# Patient Record
Sex: MALE | Race: WHITE | NOT HISPANIC OR LATINO
[De-identification: names, ages, dates, MRNs, and addresses within clinical notes are randomized per-mention and may not be internally consistent; named-entity substitution may affect disease eponyms.]

---

## 2020-01-22 ENCOUNTER — TRANSCRIPTION ENCOUNTER (OUTPATIENT)
Age: 35
End: 2020-01-22

## 2020-02-20 ENCOUNTER — APPOINTMENT (OUTPATIENT)
Dept: INTERNAL MEDICINE | Facility: CLINIC | Age: 35
End: 2020-02-20
Payer: COMMERCIAL

## 2020-02-20 ENCOUNTER — TRANSCRIPTION ENCOUNTER (OUTPATIENT)
Age: 35
End: 2020-02-20

## 2020-02-20 VITALS
HEART RATE: 58 BPM | DIASTOLIC BLOOD PRESSURE: 68 MMHG | TEMPERATURE: 97.6 F | HEIGHT: 71 IN | OXYGEN SATURATION: 98 % | WEIGHT: 230 LBS | BODY MASS INDEX: 32.2 KG/M2 | SYSTOLIC BLOOD PRESSURE: 112 MMHG

## 2020-02-20 DIAGNOSIS — Z82.69 FAMILY HISTORY OF OTHER DISEASES OF THE MUSCULOSKELETAL SYSTEM AND CONNECTIVE TISSUE: ICD-10-CM

## 2020-02-20 DIAGNOSIS — Z82.0 FAMILY HISTORY OF EPILEPSY AND OTHER DISEASES OF THE NERVOUS SYSTEM: ICD-10-CM

## 2020-02-20 DIAGNOSIS — Z87.891 PERSONAL HISTORY OF NICOTINE DEPENDENCE: ICD-10-CM

## 2020-02-20 DIAGNOSIS — Z11.4 ENCOUNTER FOR SCREENING FOR HUMAN IMMUNODEFICIENCY VIRUS [HIV]: ICD-10-CM

## 2020-02-20 DIAGNOSIS — Z82.49 FAMILY HISTORY OF ISCHEMIC HEART DISEASE AND OTHER DISEASES OF THE CIRCULATORY SYSTEM: ICD-10-CM

## 2020-02-20 DIAGNOSIS — S62.91XA UNSPECIFIED FRACTURE OF RIGHT WRIST AND HAND, INITIAL ENCOUNTER FOR CLOSED FRACTURE: ICD-10-CM

## 2020-02-20 DIAGNOSIS — J45.909 UNSPECIFIED ASTHMA, UNCOMPLICATED: ICD-10-CM

## 2020-02-20 DIAGNOSIS — R06.89 OTHER ABNORMALITIES OF BREATHING: ICD-10-CM

## 2020-02-20 DIAGNOSIS — Z83.49 FAMILY HISTORY OF OTHER ENDOCRINE, NUTRITIONAL AND METABOLIC DISEASES: ICD-10-CM

## 2020-02-20 PROCEDURE — 94010 BREATHING CAPACITY TEST: CPT

## 2020-02-20 PROCEDURE — 93000 ELECTROCARDIOGRAM COMPLETE: CPT

## 2020-02-20 PROCEDURE — 99385 PREV VISIT NEW AGE 18-39: CPT | Mod: 25

## 2020-02-20 RX ORDER — MULTIVITAMIN
TABLET ORAL
Refills: 0 | Status: ACTIVE | COMMUNITY

## 2020-02-20 NOTE — HEALTH RISK ASSESSMENT
[0] : 1) Little interest or pleasure doing things: Not at all (0) [Yes] : Yes [2 - 3 times a week (3 pts)] : 2 - 3  times a week (3 points) [7 to 9 (3 pts)] : 7 to 9 (3  points) [No] : In the past 12 months have you used drugs other than those required for medical reasons? No [HIV Test offered] : HIV Test offered [Designated Healthcare Proxy] : Designated healthcare proxy [Name: ___] : Health Care Proxy's Name: [unfilled]  [Relationship: ___] : Relationship: [unfilled] [Audit-CScore] : 6 [] : No [de-identified] : pt was seen by dentist last wk [AdvancecareDate] : 02/20

## 2020-02-20 NOTE — PHYSICAL EXAM
[Well Nourished] : well nourished [No Acute Distress] : no acute distress [Well Developed] : well developed [Well-Appearing] : well-appearing [PERRL] : pupils equal round and reactive to light [Normal Sclera/Conjunctiva] : normal sclera/conjunctiva [Normal Outer Ear/Nose] : the outer ears and nose were normal in appearance [Normal TMs] : both tympanic membranes were normal [Normal Oropharynx] : the oropharynx was normal [No Lymphadenopathy] : no lymphadenopathy [Supple] : supple [No Respiratory Distress] : no respiratory distress  [Thyroid Normal, No Nodules] : the thyroid was normal and there were no nodules present [No Accessory Muscle Use] : no accessory muscle use [Regular Rhythm] : with a regular rhythm [Clear to Auscultation] : lungs were clear to auscultation bilaterally [Normal Rate] : normal rate  [Normal S1, S2] : normal S1 and S2 [No Murmur] : no murmur heard [No Edema] : there was no peripheral edema [Soft] : abdomen soft [Non Tender] : non-tender [Non-distended] : non-distended [No Masses] : no abdominal mass palpated [Normal Supraclavicular Nodes] : no supraclavicular lymphadenopathy [Normal Bowel Sounds] : normal bowel sounds [Normal Axillary Nodes] : no axillary lymphadenopathy [Normal Posterior Cervical Nodes] : no posterior cervical lymphadenopathy [Normal Anterior Cervical Nodes] : no anterior cervical lymphadenopathy [Normal Inguinal Nodes] : no inguinal lymphadenopathy [Grossly Normal Strength/Tone] : grossly normal strength/tone [No Focal Deficits] : no focal deficits [Normal Gait] : normal gait [Normal Affect] : the affect was normal [Normal Insight/Judgement] : insight and judgment were intact [de-identified] : rash-erythematous scaly rash-scalp, ext ear- R.  pitting finger nails

## 2020-02-20 NOTE — PLAN
[FreeTextEntry1] : rec pt not use keto pills\par EKG- sinus missy 50\par spirometry-nl- no obstruction\par f/u if breathing issue is worse

## 2020-02-20 NOTE — HISTORY OF PRESENT ILLNESS
[FreeTextEntry1] : CPE [de-identified] : vaccine-10/19.  pt will release rec tdap vaccine\par diet- reviewed w diet\par exercise- walking 15,000 steps/ day.  has feeling that he has to take deep breath after eating- lasting a few min- started 6 yr ago - 1 hr.  resolves spontaneous.  no SOB w activity.  no abd pain or CP after meals\par asthma- last episode was in childhood\par tinnitus- intermittent-resolves in 1 min- - " ringing"- occurs a few times/mo- started a few yr ago.  no hearing loss\par pt stopped smoking 1 mo ago- cold turkey\par taking Keto pills for obesity\par 12 pk- on the weekend\par no testicular pain

## 2020-02-20 NOTE — COUNSELING
[Potential consequences of obesity discussed] : Potential consequences of obesity discussed [Structured Weight Management Program suggested:] : Structured weight management program suggested [Benefits of weight loss discussed] : Benefits of weight loss discussed [Encouraged to increase physical activity] : Encouraged to increase physical activity [Good understanding] : Patient has a good understanding of disease, goals and obesity follow-up plan

## 2020-02-25 ENCOUNTER — APPOINTMENT (OUTPATIENT)
Dept: INTERNAL MEDICINE | Facility: CLINIC | Age: 35
End: 2020-02-25
Payer: COMMERCIAL

## 2020-02-25 PROCEDURE — 36415 COLL VENOUS BLD VENIPUNCTURE: CPT

## 2020-02-26 LAB
ALBUMIN SERPL ELPH-MCNC: 4.8 G/DL
ALP BLD-CCNC: 59 U/L
ALT SERPL-CCNC: 27 U/L
ANION GAP SERPL CALC-SCNC: 13 MMOL/L
AST SERPL-CCNC: 20 U/L
BASOPHILS # BLD AUTO: 0.03 K/UL
BASOPHILS NFR BLD AUTO: 0.6 %
BILIRUB SERPL-MCNC: 0.3 MG/DL
BUN SERPL-MCNC: 16 MG/DL
CALCIUM SERPL-MCNC: 10 MG/DL
CHLORIDE SERPL-SCNC: 106 MMOL/L
CHOLEST SERPL-MCNC: 227 MG/DL
CHOLEST/HDLC SERPL: 4 RATIO
CO2 SERPL-SCNC: 25 MMOL/L
CREAT SERPL-MCNC: 0.8 MG/DL
EOSINOPHIL # BLD AUTO: 0.14 K/UL
EOSINOPHIL NFR BLD AUTO: 2.6 %
GLUCOSE SERPL-MCNC: 95 MG/DL
HCT VFR BLD CALC: 44.6 %
HDLC SERPL-MCNC: 57 MG/DL
HGB BLD-MCNC: 15.1 G/DL
HIV1+2 AB SPEC QL IA.RAPID: NONREACTIVE
IMM GRANULOCYTES NFR BLD AUTO: 0.2 %
LDLC SERPL CALC-MCNC: 141 MG/DL
LYMPHOCYTES # BLD AUTO: 1.27 K/UL
LYMPHOCYTES NFR BLD AUTO: 23.3 %
MAN DIFF?: NORMAL
MCHC RBC-ENTMCNC: 32.3 PG
MCHC RBC-ENTMCNC: 33.9 GM/DL
MCV RBC AUTO: 95.3 FL
MONOCYTES # BLD AUTO: 0.37 K/UL
MONOCYTES NFR BLD AUTO: 6.8 %
NEUTROPHILS # BLD AUTO: 3.62 K/UL
NEUTROPHILS NFR BLD AUTO: 66.5 %
PLATELET # BLD AUTO: 253 K/UL
POTASSIUM SERPL-SCNC: 4.7 MMOL/L
PROT SERPL-MCNC: 7.4 G/DL
RBC # BLD: 4.68 M/UL
RBC # FLD: 12.8 %
SAVE SPECIMEN: NORMAL
SODIUM SERPL-SCNC: 144 MMOL/L
T4 FREE SERPL-MCNC: 1 NG/DL
TRIGL SERPL-MCNC: 149 MG/DL
TSH SERPL-ACNC: 1.45 UIU/ML
WBC # FLD AUTO: 5.44 K/UL

## 2020-04-20 ENCOUNTER — APPOINTMENT (OUTPATIENT)
Dept: DISASTER EMERGENCY | Facility: CLINIC | Age: 35
End: 2020-04-20

## 2020-04-26 ENCOUNTER — MESSAGE (OUTPATIENT)
Age: 35
End: 2020-04-26

## 2020-04-29 ENCOUNTER — EMERGENCY (EMERGENCY)
Facility: HOSPITAL | Age: 35
LOS: 1 days | Discharge: HOME | End: 2020-04-29
Admitting: EMERGENCY MEDICINE
Payer: COMMERCIAL

## 2020-04-29 VITALS
SYSTOLIC BLOOD PRESSURE: 146 MMHG | DIASTOLIC BLOOD PRESSURE: 84 MMHG | TEMPERATURE: 98 F | OXYGEN SATURATION: 100 % | RESPIRATION RATE: 18 BRPM | HEART RATE: 74 BPM

## 2020-04-29 PROCEDURE — 99283 EMERGENCY DEPT VISIT LOW MDM: CPT

## 2020-04-29 NOTE — ED ADULT TRIAGE NOTE - CHIEF COMPLAINT QUOTE
c/o needle stick that fell form sharps container, endorses skin puncture to left leg. denies medical hx.

## 2020-04-30 RX ORDER — RALTEGRAVIR 400 MG/1
1 TABLET, FILM COATED ORAL
Qty: 42 | Refills: 0
Start: 2020-04-30 | End: 2020-05-20

## 2020-04-30 RX ORDER — EMTRICITABINE AND TENOFOVIR DISOPROXIL FUMARATE 200; 300 MG/1; MG/1
1 TABLET, FILM COATED ORAL
Qty: 21 | Refills: 0
Start: 2020-04-30 | End: 2020-05-20

## 2020-04-30 NOTE — ED PROVIDER NOTE - PROGRESS NOTE DETAILS
LUIS MOE:  Pt was given 1 week supply of PEP.  Pt took STAT dose in ED.  Pt medically stable for discharge.  Pt to follow up with PMD and employee health within 3 days.

## 2020-04-30 NOTE — ED PROVIDER NOTE - PHYSICAL EXAMINATION
+ puncture wound at left anterior thigh; no redness; no warmth; no tenderness; no swelling; no crepitus

## 2020-04-30 NOTE — ED PROVIDER NOTE - NSFOLLOWUPINSTRUCTIONS_ED_ALL_ED_FT
Advance activity as tolerated.  Continue all previously prescribed medications as directed unless otherwise instructed.  Take Truvada once a day for one month.  Take Isentress twice a day for one month.  Follow up with your primary care physician in 48-72 hours and employee health services in 3 days- bring copies of your results.  Return to the ER for worsening or persistent symptoms, including but not limited to redness, swelling, pain, pus at puncture wound and/or ANY NEW OR CONCERNING SYMPTOMS. If you have issues obtaining follow up, please call: [phone] to obtain a doctor or specialist who takes your insurance in your area.  You may call [phone] to make an appointment with the internal medicine clinic.

## 2020-04-30 NOTE — ED PROVIDER NOTE - OBJECTIVE STATEMENT
Pt is a 33 y/o M former smoker no PMHx p/w needle stick injury to left thigh 3 hours ago.  Pt works for [employer].  While on the job, pt placed an "ALS working bag" onto his lap at which point he experienced sudden onset sharp pain to left anterior thigh.  Upon inspection of bag, he noticed that a 20 gauge IO needle fell out of a sharps container, had poked through bag and caused puncture wound to left anterior thigh.  He states he cleaned wound immediately.  He states he does not know which patient the IO needle was used on.  He states there was no visible blood on IO needle.  He was sent to ED by employer for evaluation.  He states he would like PEP medication.  Pt notes last tetanus shot was 6 years ago.  He states he has had his hepatitis vaccination.  Pt denies any fevers, chills, pain at puncture wound or any other specific complaints.

## 2020-04-30 NOTE — ED PROVIDER NOTE - PATIENT PORTAL LINK FT
You can access the FollowMyHealth Patient Portal offered by [facility] by registering at the following website: [email or web address]. By joining [facility]’s FollowMyHealth portal, you will also be able to view your health information using other applications (apps) compatible with our system.

## 2020-04-30 NOTE — ED PROVIDER NOTE - CLINICAL SUMMARY MEDICAL DECISION MAKING FREE TEXT BOX
Pt is a 33 y/o M no PMHx p/w needlestick injury today -- needlestick injury without known source patient; tetanus up to date -- pt electing to start PEP, employee health follow up Pt is a 35 y/o M former smoker no PMHx p/w needle stick injury to left thigh 3 hours ago.  -- needlestick injury without known source patient; tetanus up to date -- pt electing to start PEP, employee health follow up

## 2020-05-06 ENCOUNTER — APPOINTMENT (OUTPATIENT)
Dept: DISASTER EMERGENCY | Facility: CLINIC | Age: 35
End: 2020-05-06

## 2020-05-07 LAB
SARS-COV-2 IGG SERPL IA-ACNC: 0.1 INDEX
SARS-COV-2 IGG SERPL QL IA: NEGATIVE

## 2020-07-27 ENCOUNTER — OUTPATIENT (OUTPATIENT)
Dept: OUTPATIENT SERVICES | Facility: HOSPITAL | Age: 35
LOS: 1 days | End: 2020-07-27
Payer: COMMERCIAL

## 2020-07-27 ENCOUNTER — APPOINTMENT (OUTPATIENT)
Dept: RADIOLOGY | Facility: CLINIC | Age: 35
End: 2020-07-27
Payer: OTHER MISCELLANEOUS

## 2020-07-27 DIAGNOSIS — Z00.8 ENCOUNTER FOR OTHER GENERAL EXAMINATION: ICD-10-CM

## 2020-07-27 PROCEDURE — 72110 X-RAY EXAM L-2 SPINE 4/>VWS: CPT | Mod: 26

## 2020-07-27 PROCEDURE — 72110 X-RAY EXAM L-2 SPINE 4/>VWS: CPT

## 2021-09-01 ENCOUNTER — APPOINTMENT (OUTPATIENT)
Dept: FAMILY MEDICINE | Facility: CLINIC | Age: 36
End: 2021-09-01
Payer: COMMERCIAL

## 2021-09-01 ENCOUNTER — NON-APPOINTMENT (OUTPATIENT)
Age: 36
End: 2021-09-01

## 2021-09-01 VITALS
TEMPERATURE: 97.6 F | BODY MASS INDEX: 35.28 KG/M2 | RESPIRATION RATE: 14 BRPM | HEIGHT: 71 IN | WEIGHT: 252 LBS | OXYGEN SATURATION: 98 % | HEART RATE: 91 BPM

## 2021-09-01 VITALS — SYSTOLIC BLOOD PRESSURE: 179 MMHG | DIASTOLIC BLOOD PRESSURE: 85 MMHG

## 2021-09-01 PROCEDURE — 99395 PREV VISIT EST AGE 18-39: CPT | Mod: 25

## 2021-09-01 PROCEDURE — 99385 PREV VISIT NEW AGE 18-39: CPT | Mod: 25

## 2021-09-01 PROCEDURE — 36415 COLL VENOUS BLD VENIPUNCTURE: CPT

## 2021-09-01 PROCEDURE — 93000 ELECTROCARDIOGRAM COMPLETE: CPT

## 2021-09-01 NOTE — HEALTH RISK ASSESSMENT
[] : Yes [0-4] : 0-4 [4 or more  times a week (4 pts)] : 4 or more  times a week (4 points) [10 or more (4 pts)] : 10 or more (4  points) [Daily or almost daily (4 pts)] : Daily or almost daily (4 points) [Yes] : In the past 12 months have you used drugs other than those required for medical reasons? Yes [0] : 2) Feeling down, depressed, or hopeless: Not at all (0) [PHQ-2 Negative - No further assessment needed] : PHQ-2 Negative - No further assessment needed [HIV Test offered] : HIV Test offered [With Significant Other] : lives with significant other [Employed] : employed [Single] : single [de-identified] : a pack every 2 days. smokes from summer-fall- q6 months. x 17-18 years.  [de-identified] : drinks 6pack-12 pack daily, hard liquor shots 4-5 shots. he used to drink a hard liquor bottle a day. since march 2020.  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

## 2021-09-01 NOTE — ASSESSMENT
[FreeTextEntry1] : Physical Exam:\par Constitutional: No acute distress, well appearing\par HEENT: Normocephalic, atraumatic. 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 \par - of note, his BP is elevated today. asymptomatc I have advised him to f/u in 1 week to review bloodwork and recheck BP. If BP remains high, we will plan to start medication\par \par smoker:\par he is an everyday smoker, but only smokes during the summer and fall season..\par - Reinforced the importance of quitting smoking and the risk of otherwise developing cardiovascular disease, lung cancer as well as other chronic lung disorders. Patient verbalized understanding.\par - will f/u bloodwork. if wnl, we can discuss pharmacotherapy options to help in quitting. for the time being he will try to quit on his own. \par \par alcohol abuse\par he drinks a large amount of alcohol\par reports no signs of withdrawal. likely bc he drinks daily and does not give his body a chance to withdraw. \par - will f/u bloodwork results\par - i have explained to him the importance of quitting as this can affect heavily on his health, including HLD, HTN, and possible permanent liver, heart, kidney issues down the line. \par - have provided him with drug abuse hotline and resources\par - refused drug screening\par - f/u 1 week\par \par 
(0) understands/communicates without difficulty

## 2021-09-01 NOTE — HISTORY OF PRESENT ILLNESS
[FreeTextEntry1] : Patient is here for a complete physical exam  [de-identified] : 36y M w/ PMHx HLD, alcohol abuse, current smoker, presenting to establish care. \par \par alcohol abuse: has been drinking a heavy amount (see HRA for more info) since march 2021. denies CP, SOB, abdominal pain, melena, blood in stool, nausea, vomiting, fever, chills, leg swelling. He gained 36 lb since january 2021. he stopped eating healthy since the pandemic started. he also goes to bars frequently- although not everyday recently. he got both covid shots- last one in february. 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 He has no issues sleeping. he goes to bed by 8pm and wakes up at 4am. he drinks during the day, after work. Most of his friends drink, he does it out of boredom. he is not depressed. last drink was last night. hes never experienced withdrawal symptoms. \par \par

## 2021-09-02 LAB
ALBUMIN SERPL ELPH-MCNC: 5.1 G/DL
ALP BLD-CCNC: 73 U/L
ALT SERPL-CCNC: 31 U/L
ANION GAP SERPL CALC-SCNC: 17 MMOL/L
AST SERPL-CCNC: 24 U/L
BASOPHILS # BLD AUTO: 0.04 K/UL
BASOPHILS NFR BLD AUTO: 0.6 %
BILIRUB SERPL-MCNC: 0.3 MG/DL
BUN SERPL-MCNC: 14 MG/DL
CALCIUM SERPL-MCNC: 10 MG/DL
CHLORIDE SERPL-SCNC: 103 MMOL/L
CHOLEST SERPL-MCNC: 279 MG/DL
CO2 SERPL-SCNC: 23 MMOL/L
CREAT SERPL-MCNC: 0.69 MG/DL
EOSINOPHIL # BLD AUTO: 0.16 K/UL
EOSINOPHIL NFR BLD AUTO: 2.6 %
ESTIMATED AVERAGE GLUCOSE: 103 MG/DL
ETHANOL BLD-MCNC: <10 MG/DL
GLUCOSE SERPL-MCNC: 95 MG/DL
HBA1C MFR BLD HPLC: 5.2 %
HCT VFR BLD CALC: 50.7 %
HDLC SERPL-MCNC: 58 MG/DL
HGB BLD-MCNC: 16.8 G/DL
HIV1+2 AB SPEC QL IA.RAPID: NONREACTIVE
IMM GRANULOCYTES NFR BLD AUTO: 0.5 %
LDLC SERPL CALC-MCNC: 175 MG/DL
LYMPHOCYTES # BLD AUTO: 1.45 K/UL
LYMPHOCYTES NFR BLD AUTO: 23.2 %
MAN DIFF?: NORMAL
MCHC RBC-ENTMCNC: 32.8 PG
MCHC RBC-ENTMCNC: 33.1 GM/DL
MCV RBC AUTO: 99 FL
MONOCYTES # BLD AUTO: 0.47 K/UL
MONOCYTES NFR BLD AUTO: 7.5 %
NEUTROPHILS # BLD AUTO: 4.09 K/UL
NEUTROPHILS NFR BLD AUTO: 65.6 %
NONHDLC SERPL-MCNC: 221 MG/DL
PLATELET # BLD AUTO: 265 K/UL
POTASSIUM SERPL-SCNC: 4.1 MMOL/L
PROT SERPL-MCNC: 7.6 G/DL
RBC # BLD: 5.12 M/UL
RBC # FLD: 13 %
SODIUM SERPL-SCNC: 143 MMOL/L
T4 FREE SERPL-MCNC: 1 NG/DL
TRIGL SERPL-MCNC: 231 MG/DL
TSH SERPL-ACNC: 1.26 UIU/ML
WBC # FLD AUTO: 6.24 K/UL

## 2021-09-03 ENCOUNTER — NON-APPOINTMENT (OUTPATIENT)
Age: 36
End: 2021-09-03

## 2021-09-07 ENCOUNTER — APPOINTMENT (OUTPATIENT)
Dept: FAMILY MEDICINE | Facility: CLINIC | Age: 36
End: 2021-09-07
Payer: COMMERCIAL

## 2021-09-07 VITALS — HEART RATE: 92 BPM | RESPIRATION RATE: 14 BRPM | OXYGEN SATURATION: 98 % | TEMPERATURE: 98.6 F

## 2021-09-07 VITALS — SYSTOLIC BLOOD PRESSURE: 172 MMHG | DIASTOLIC BLOOD PRESSURE: 105 MMHG

## 2021-09-07 VITALS — DIASTOLIC BLOOD PRESSURE: 90 MMHG | SYSTOLIC BLOOD PRESSURE: 148 MMHG

## 2021-09-07 PROCEDURE — 99213 OFFICE O/P EST LOW 20 MIN: CPT

## 2021-09-07 NOTE — ASSESSMENT
[FreeTextEntry1] : Physical Exam:\par Constitutional: No acute distress, well appearing\par HEENT: Normocephalic, atraumatic\par Neck: supple\par Cardiac: S1S2, Regular rate and rhythm, No murmurs\par Pulmonary: No respiratory distress, Lungs clear to auscultation bilaterally, no wheezing, rales, or rhonchi\par Abdomen: Soft, non-tender, non-distended, no guarding, normal bowel sounds\par Vascular: No peripheral edema\par Neurology: Coordination grossly intact, no focal deficits\par Psychiatric: Alert and oriented x3, normal mood\par \par \par \par A/P:\par ALcohol abuse:\par has been cutting down alcohol use and drinking only weekends now\par - advised him to avoid binge drinking and the detrimental effect it can have on his health including blood pressure.  \par \par Smoker:\par has quit smoking\par - have encouraged him to continue to not smoke. he is agreeable and tolerating well. \par \par HLD: \par he reports not eating a healthy diet. it has increased since the past year\par - he is aware of the risk of CVD with elevated BP but is refusing medications at this time and is determined to improve his levels with lifestyle modif first including healthy dieting and regular exercise. \par - will check level again after lifestyle modif, and if continues to be elevated he was agreeable to start medication\par \par HTN:\par he has quit smoking and is cutting down on his alcohol use\par has been checking BP at home and it has also been elevated. \par in office, BP values differ in each arm: L arm: 172/105 and R arm 148/90\par - has refused medications at this time. he wants a trial of lifestyle changes first. \par - was agreeable to get renal doppler to r/o renovascular HTN\par - agreeable also to see cardiology for furhter evaluation- given referral today\par - have advised him in the meantime to continue to check his BP at home, this time from both arms as well and bring log with him to follow up visit and to cardiology visit\par - f/u 1 month. was agreeable to start medication if not improved with lifestyle modif alone

## 2021-09-07 NOTE — HISTORY OF PRESENT ILLNESS
[FreeTextEntry1] : Pt is here for one week follow up. [de-identified] : 36y M w/ PMHx HLD, alcohol abuse, current smoker, presenting for f/u.\par \par ALcohol abuse and also gained 36 lb since 1/2021. 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 he has been cutting down and is drinking only weekends now. \par \par Smoker: quit smoking since thursday. he went through nicotine withdrawal for the last 2 days but he is feeling fine today.\par \par HLD: he reports not eating a healthy diet \par \par He has been checking his BP at home with average in the 140s/80s. he has eliminated a lot of salty foods and beer. he is avoiding hot dogs and other processed foods. he thinks his father also had HTN.

## 2021-10-05 ENCOUNTER — APPOINTMENT (OUTPATIENT)
Dept: FAMILY MEDICINE | Facility: CLINIC | Age: 36
End: 2021-10-05

## 2022-01-24 ENCOUNTER — TRANSCRIPTION ENCOUNTER (OUTPATIENT)
Age: 37
End: 2022-01-24

## 2022-03-31 ENCOUNTER — APPOINTMENT (OUTPATIENT)
Dept: DERMATOLOGY | Facility: CLINIC | Age: 37
End: 2022-03-31

## 2022-04-19 ENCOUNTER — APPOINTMENT (OUTPATIENT)
Dept: FAMILY MEDICINE | Facility: CLINIC | Age: 37
End: 2022-04-19
Payer: COMMERCIAL

## 2022-04-19 VITALS
TEMPERATURE: 97.8 F | HEIGHT: 71 IN | SYSTOLIC BLOOD PRESSURE: 162 MMHG | BODY MASS INDEX: 34.86 KG/M2 | WEIGHT: 249 LBS | HEART RATE: 84 BPM | RESPIRATION RATE: 14 BRPM | DIASTOLIC BLOOD PRESSURE: 98 MMHG | OXYGEN SATURATION: 97 %

## 2022-04-19 VITALS — DIASTOLIC BLOOD PRESSURE: 90 MMHG | SYSTOLIC BLOOD PRESSURE: 145 MMHG

## 2022-04-19 DIAGNOSIS — L40.9 PSORIASIS, UNSPECIFIED: ICD-10-CM

## 2022-04-19 PROCEDURE — 99214 OFFICE O/P EST MOD 30 MIN: CPT

## 2022-04-19 NOTE — HISTORY OF PRESENT ILLNESS
[FreeTextEntry1] : Patient is here for High BP [de-identified] : 36y M w/ PMHx HLD, alcohol abuse, current smoker, psoriasis, presenting for f/u. he is worried about his elevated BP.\par \par alcohol abuse: drinking now only occasionally. \par \par smoker: has not smoked since September. \par \par HLD: has tried improving his eating habits. \par \par HTN: BP elev last visit in september but he refused meds bc wanted trial of lifestyle modif. he already quit smoking and greatly decreased his alchol drinking however he has noticed his BP to be elevated still. he had a headache at work 2 weeks ago and BP was 175/119. he took 4 aspirins and green tea and then the next day it decreased to 140 systolic. systolic is never less than 140. diastolic averages between 88-96. 1 episode of HA. no vision changes, CP, SOB, leg swelling. no recent illness. \par \par psoriasis: will be starting cosentyx with his derm [facility] in [place].

## 2022-04-20 LAB
ALBUMIN SERPL ELPH-MCNC: 4.7 G/DL
ALP BLD-CCNC: 70 U/L
ALT SERPL-CCNC: 34 U/L
ANION GAP SERPL CALC-SCNC: 14 MMOL/L
AST SERPL-CCNC: 19 U/L
BILIRUB SERPL-MCNC: 0.4 MG/DL
BUN SERPL-MCNC: 12 MG/DL
CALCIUM SERPL-MCNC: 10 MG/DL
CHLORIDE SERPL-SCNC: 101 MMOL/L
CHOLEST SERPL-MCNC: 231 MG/DL
CO2 SERPL-SCNC: 26 MMOL/L
CREAT SERPL-MCNC: 0.88 MG/DL
EGFR: 115 ML/MIN/1.73M2
GLUCOSE SERPL-MCNC: 80 MG/DL
HDLC SERPL-MCNC: 47 MG/DL
LDLC SERPL CALC-MCNC: 138 MG/DL
NONHDLC SERPL-MCNC: 184 MG/DL
POTASSIUM SERPL-SCNC: 4.6 MMOL/L
PROT SERPL-MCNC: 7.2 G/DL
SODIUM SERPL-SCNC: 141 MMOL/L
TRIGL SERPL-MCNC: 231 MG/DL

## 2022-05-25 ENCOUNTER — APPOINTMENT (OUTPATIENT)
Dept: FAMILY MEDICINE | Facility: CLINIC | Age: 37
End: 2022-05-25
Payer: COMMERCIAL

## 2022-05-25 VITALS
TEMPERATURE: 97.7 F | SYSTOLIC BLOOD PRESSURE: 151 MMHG | RESPIRATION RATE: 14 BRPM | HEART RATE: 101 BPM | DIASTOLIC BLOOD PRESSURE: 106 MMHG | OXYGEN SATURATION: 98 %

## 2022-05-25 VITALS — WEIGHT: 248 LBS | BODY MASS INDEX: 34.59 KG/M2

## 2022-05-25 VITALS — SYSTOLIC BLOOD PRESSURE: 145 MMHG | DIASTOLIC BLOOD PRESSURE: 100 MMHG

## 2022-05-25 DIAGNOSIS — F17.200 NICOTINE DEPENDENCE, UNSPECIFIED, UNCOMPLICATED: ICD-10-CM

## 2022-05-25 DIAGNOSIS — F10.10 ALCOHOL ABUSE, UNCOMPLICATED: ICD-10-CM

## 2022-05-25 PROCEDURE — 99214 OFFICE O/P EST MOD 30 MIN: CPT

## 2022-05-25 NOTE — HISTORY OF PRESENT ILLNESS
[FreeTextEntry1] : Pt is here to follow up on BP check  [de-identified] : 36Y M w/ PMHx HLD, former smoker, former alcohol abuse, psoriasis, presenting for f/u.\par \par alcohol abuse: only drinking alcohol occasionally now\par \par smoker: has not smoked since september\par \par HLD: lifestyle modif. improved since quitting smoking and cutting down alcohol.\par \par HTN: we started amlo 2.5 last visit. today is 1 month f/u. has been checking BP at work which fluctuates between 135-155 and .

## 2022-05-25 NOTE — ASSESSMENT
[FreeTextEntry1] : Physical Exam:\par Constitutional: No acute distress, well appearing\par HEENT: Normocephalic, atraumatic\par Neck: supple\par Cardiac: S1S2, Regular rate and rhythm, No murmurs\par Pulmonary: No respiratory distress, Lungs clear to auscultation bilaterally, no wheezing, rales, or rhonchi\par Abdomen: Soft, non-tender, non-distended, no guarding, normal bowel sounds\par Vascular: No peripheral edema\par Neurology: Coordination grossly intact, no focal deficits\par Psychiatric: Alert and oriented x3, normal mood\par \par \par \par A/P:\par alcohol abuse: \par resolved\par - only drinking occasionally now\par \par smoker:\par resolved/ quit\par \par HLD: \par improved\par - c/w lifestyle modif\par \par HTN:\par BP still elevated\par - will increase amlodipine to 10mg\par - he was advised to f/u 3 weeks. he was strongly recommended to check his BP at home if felt lightheaded or dizzy and call the office if noted BP < 100 systolic. if so i will send rx for amlodipine 5mg. \par - pt showed understanding and was agreeable w/ all of the above. \par

## 2022-06-21 ENCOUNTER — APPOINTMENT (OUTPATIENT)
Dept: FAMILY MEDICINE | Facility: CLINIC | Age: 37
End: 2022-06-21

## 2022-06-29 ENCOUNTER — RX RENEWAL (OUTPATIENT)
Age: 37
End: 2022-06-29

## 2022-07-16 ENCOUNTER — APPOINTMENT (OUTPATIENT)
Dept: FAMILY MEDICINE | Facility: CLINIC | Age: 37
End: 2022-07-16

## 2022-08-02 ENCOUNTER — RX RENEWAL (OUTPATIENT)
Age: 37
End: 2022-08-02

## 2022-10-27 ENCOUNTER — RESULT REVIEW (OUTPATIENT)
Age: 37
End: 2022-10-27

## 2023-03-21 ENCOUNTER — APPOINTMENT (OUTPATIENT)
Dept: FAMILY MEDICINE | Facility: CLINIC | Age: 38
End: 2023-03-21
Payer: COMMERCIAL

## 2023-03-21 VITALS
HEIGHT: 71 IN | WEIGHT: 246 LBS | DIASTOLIC BLOOD PRESSURE: 109 MMHG | RESPIRATION RATE: 14 BRPM | BODY MASS INDEX: 34.44 KG/M2 | TEMPERATURE: 98.2 F | HEART RATE: 86 BPM | SYSTOLIC BLOOD PRESSURE: 158 MMHG | OXYGEN SATURATION: 98 %

## 2023-03-21 DIAGNOSIS — Z00.00 ENCOUNTER FOR GENERAL ADULT MEDICAL EXAMINATION W/OUT ABNORMAL FINDINGS: ICD-10-CM

## 2023-03-21 PROCEDURE — 99395 PREV VISIT EST AGE 18-39: CPT | Mod: 25

## 2023-03-21 PROCEDURE — G0444 DEPRESSION SCREEN ANNUAL: CPT | Mod: 59

## 2023-03-21 NOTE — ASSESSMENT
[FreeTextEntry1] : CPE-orders given for routine labs, will follow-up results.\par \par I spent 5 minutes performing a depression screening on this patient.\par \par Hypertension-uncontrolled.  He has been without his medications for the past 6 months.  We will restart amlodipine 10 mg and advised him to keep a blood pressure log.  He will return in 1 month for reevaluation.  If no improvement, will add additional agent.  Also counseled on low-sodium diet\par \par Hyperlipidemia-continue healthy lifestyle modifications, will recheck lipid panel.\par \par RTC in 1 month for blood pressure recheck.

## 2023-03-21 NOTE — HISTORY OF PRESENT ILLNESS
[FreeTextEntry1] : pt. here for cChongpbelen. [de-identified] : Bg is a 38 yo M with hypertension who presents today for CPE.  He states that he ran out of his amlodipine 6 months ago so has not been taking it.  He was previously on amlodipine 10 mg and states that his blood pressure was improved on the medication.  He has been working on healthy lifestyle changes including healthy diet and regular exercise.\par \par He believes his last Tdap vaccine was 3 years ago in 2020.\par \par For his psoriasis and eczema, he follows with dermatology.\par \par He is only drinking on his days off 2-3 times per week, drinks 3-4 beers, denies any binge drinking.

## 2023-03-21 NOTE — HEALTH RISK ASSESSMENT
[Yes] : Yes [2 - 3 times a week (3 pts)] : 2 - 3  times a week (3 points) [3 or 4 (1 pt)] : 3 or 4  (1 point) [Never (0 pts)] : Never (0 points) [No] : In the past 12 months have you used drugs other than those required for medical reasons? No [0] : 2) Feeling down, depressed, or hopeless: Not at all (0) [PHQ-2 Negative - No further assessment needed] : PHQ-2 Negative - No further assessment needed [Employed] : employed [Fully functional (bathing, dressing, toileting, transferring, walking, feeding)] : Fully functional (bathing, dressing, toileting, transferring, walking, feeding) [Fully functional (using the telephone, shopping, preparing meals, housekeeping, doing laundry, using] : Fully functional and needs no help or supervision to perform IADLs (using the telephone, shopping, preparing meals, housekeeping, doing laundry, using transportation, managing medications and managing finances) [Former] : Former [0-4] : 0-4 [< 15 Years] : < 15 Years [[id number]] : 0 [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

## 2023-03-24 ENCOUNTER — NON-APPOINTMENT (OUTPATIENT)
Age: 38
End: 2023-03-24

## 2023-03-24 LAB
25(OH)D3 SERPL-MCNC: 19 NG/ML
ALBUMIN SERPL ELPH-MCNC: 5.1 G/DL
ALP BLD-CCNC: 74 U/L
ALT SERPL-CCNC: 74 U/L
ANION GAP SERPL CALC-SCNC: 17 MMOL/L
AST SERPL-CCNC: 48 U/L
BASOPHILS # BLD AUTO: 0.04 K/UL
BASOPHILS NFR BLD AUTO: 0.5 %
BILIRUB SERPL-MCNC: 0.3 MG/DL
BUN SERPL-MCNC: 10 MG/DL
CALCIUM SERPL-MCNC: 10 MG/DL
CHLORIDE SERPL-SCNC: 103 MMOL/L
CHOLEST SERPL-MCNC: 289 MG/DL
CO2 SERPL-SCNC: 22 MMOL/L
CREAT SERPL-MCNC: 0.77 MG/DL
EGFR: 119 ML/MIN/1.73M2
EOSINOPHIL # BLD AUTO: 0.08 K/UL
EOSINOPHIL NFR BLD AUTO: 1 %
ESTIMATED AVERAGE GLUCOSE: 103 MG/DL
GLUCOSE SERPL-MCNC: 90 MG/DL
HBA1C MFR BLD HPLC: 5.2 %
HCT VFR BLD CALC: 45.4 %
HDLC SERPL-MCNC: 53 MG/DL
HGB BLD-MCNC: 15.2 G/DL
IMM GRANULOCYTES NFR BLD AUTO: 0.2 %
LDLC SERPL CALC-MCNC: 178 MG/DL
LYMPHOCYTES # BLD AUTO: 1.84 K/UL
LYMPHOCYTES NFR BLD AUTO: 22.6 %
MAN DIFF?: NORMAL
MCHC RBC-ENTMCNC: 32.1 PG
MCHC RBC-ENTMCNC: 33.5 GM/DL
MCV RBC AUTO: 96 FL
MONOCYTES # BLD AUTO: 0.62 K/UL
MONOCYTES NFR BLD AUTO: 7.6 %
NEUTROPHILS # BLD AUTO: 5.53 K/UL
NEUTROPHILS NFR BLD AUTO: 68.1 %
NONHDLC SERPL-MCNC: 236 MG/DL
PLATELET # BLD AUTO: 288 K/UL
POTASSIUM SERPL-SCNC: 4.3 MMOL/L
PROT SERPL-MCNC: 7.7 G/DL
RBC # BLD: 4.73 M/UL
RBC # FLD: 12.8 %
SODIUM SERPL-SCNC: 142 MMOL/L
TRIGL SERPL-MCNC: 290 MG/DL
TSH SERPL-ACNC: 3.31 UIU/ML
WBC # FLD AUTO: 8.13 K/UL

## 2023-04-25 ENCOUNTER — APPOINTMENT (OUTPATIENT)
Dept: FAMILY MEDICINE | Facility: CLINIC | Age: 38
End: 2023-04-25
Payer: COMMERCIAL

## 2023-04-25 VITALS — SYSTOLIC BLOOD PRESSURE: 148 MMHG | DIASTOLIC BLOOD PRESSURE: 86 MMHG

## 2023-04-25 VITALS
RESPIRATION RATE: 14 BRPM | OXYGEN SATURATION: 98 % | HEART RATE: 105 BPM | HEIGHT: 71 IN | TEMPERATURE: 98.2 F | WEIGHT: 263 LBS | BODY MASS INDEX: 36.82 KG/M2

## 2023-04-25 DIAGNOSIS — E55.9 VITAMIN D DEFICIENCY, UNSPECIFIED: ICD-10-CM

## 2023-04-25 PROCEDURE — 99214 OFFICE O/P EST MOD 30 MIN: CPT

## 2023-04-25 NOTE — HISTORY OF PRESENT ILLNESS
[FreeTextEntry1] : pt. here for b/p recheck [de-identified] : Bg today for follow-up of hypertension.  At the time of his last visit, he had been out of his amlodipine.  Amlodipine 10 mg was restarted due to elevated blood pressure.  He has been compliant with his medication and states that he has been trying to eat healthier.  He checks his blood pressure about 4 times a week and states that average blood pressures are usually in the 140s over 80s.  States that systolic blood pressure does not get below 140.  He has been trying to drink less alcohol.  States that he did have quite a bit to drink prior to his last lab work

## 2023-04-25 NOTE — ASSESSMENT
[FreeTextEntry1] : Hypertension-improving, but still above goal.  Will add hydrochlorothiazide 12.5 mg to amlodipine 10 mg daily.  Encouraged to continue with healthy lifestyle modifications.  He will return in 1 month for blood pressure recheck at which time we will plan to repeat CMP.\par \par Hyperlipidemia-reviewed lipid panel, continue with healthy lifestyle modifications, will plan to recheck 6 months from prior\par \par Elevated LFTs-he has been decreasing alcohol use, will check CMP with next lab\par \par Vitamin D deficiency-was taking a supplement but discontinued this as he is out in the sun frequently.  Encouraged sun protection.\par \par RTC in 1 month for blood pressure recheck\par \par

## 2023-05-23 ENCOUNTER — APPOINTMENT (OUTPATIENT)
Dept: FAMILY MEDICINE | Facility: CLINIC | Age: 38
End: 2023-05-23
Payer: COMMERCIAL

## 2023-05-23 VITALS
OXYGEN SATURATION: 98 % | HEART RATE: 101 BPM | HEIGHT: 71 IN | RESPIRATION RATE: 14 BRPM | TEMPERATURE: 97.6 F | BODY MASS INDEX: 36.68 KG/M2 | DIASTOLIC BLOOD PRESSURE: 89 MMHG | SYSTOLIC BLOOD PRESSURE: 139 MMHG | WEIGHT: 262 LBS

## 2023-05-23 DIAGNOSIS — I10 ESSENTIAL (PRIMARY) HYPERTENSION: ICD-10-CM

## 2023-05-23 DIAGNOSIS — E78.5 HYPERLIPIDEMIA, UNSPECIFIED: ICD-10-CM

## 2023-05-23 DIAGNOSIS — R79.89 OTHER SPECIFIED ABNORMAL FINDINGS OF BLOOD CHEMISTRY: ICD-10-CM

## 2023-05-23 DIAGNOSIS — E66.9 OBESITY, UNSPECIFIED: ICD-10-CM

## 2023-05-23 PROCEDURE — 99214 OFFICE O/P EST MOD 30 MIN: CPT

## 2023-05-23 NOTE — ASSESSMENT
[FreeTextEntry1] : HTN-improved and at goal, continue amlodipine 10 mg and hydrochlorothiazide 12.5 mg daily.  We will recheck CMP to reevaluate LFTs and ensure no electrolyte derangement.\par \par Elevated LFTs-order for repeat CMP given.  Referral to GI given.  Counseled to continue with alcohol cessation.\par \par Hyperlipidemia-counseled on healthy lifestyle modification.\par \par Obesity-he is interested in pharmacological assistance with weight loss.  Counseled that we can try Ozempic but that he should continue with lifestyle modifications.  No family history or personal history of medullary thyroid cancer.  Will initiate 0.25 mg weekly of Ozempic and he will return in 1 month for reevaluation with titration to maintenance dose as indicated.\par \par RTC in 1 month

## 2023-05-23 NOTE — HISTORY OF PRESENT ILLNESS
[FreeTextEntry1] : b/p recheck [de-identified] : Bg is a 37-year-old male with hypertension, hyperlipidemia, elevated LFTs, and vitamin D deficiency who presents today for follow-up of hypertension.  At the time of his last visit, hydrochlorothiazide 12.5 mg was added to amlodipine 10 mg daily.  He is tolerating the medication well with no side effects.  He checks his blood pressure at work and states that average systolic blood pressures in the 120s and diastolic in 70s to 80s mmHg. \par \par He is following with dermatology for psoriasis and states that he is going to be starting Cosentyx injections.  Due to his elevated LFTs, his dermatologist wants him to see gastroenterology.\par \par He is also interested in medication to help with weight loss.  He has been trying lifestyle changes including healthy diet and regular physical activity.  He is on a wait list for nutritionist and weight loss program but is unable to be seen until 6 months from now.\par \par

## 2023-06-01 RX ORDER — SEMAGLUTIDE 0.68 MG/ML
2 INJECTION, SOLUTION SUBCUTANEOUS
Qty: 1 | Refills: 2 | Status: ACTIVE | COMMUNITY
Start: 2023-05-23

## 2023-06-07 ENCOUNTER — APPOINTMENT (OUTPATIENT)
Dept: GASTROENTEROLOGY | Facility: CLINIC | Age: 38
End: 2023-06-07

## 2023-06-13 RX ORDER — SEMAGLUTIDE 0.25 MG/.5ML
0.25 INJECTION, SOLUTION SUBCUTANEOUS
Qty: 1 | Refills: 0 | Status: ACTIVE | COMMUNITY
Start: 2023-06-01 | End: 1900-01-01

## 2023-06-26 ENCOUNTER — APPOINTMENT (OUTPATIENT)
Dept: ORTHOPEDIC SURGERY | Facility: CLINIC | Age: 38
End: 2023-06-26

## 2023-07-10 ENCOUNTER — APPOINTMENT (OUTPATIENT)
Dept: FAMILY MEDICINE | Facility: CLINIC | Age: 38
End: 2023-07-10

## 2023-07-13 ENCOUNTER — INPATIENT (INPATIENT)
Facility: HOSPITAL | Age: 38
LOS: 0 days | Discharge: HOME | DRG: 391 | End: 2023-07-14
Attending: SURGERY | Admitting: SURGERY
Payer: COMMERCIAL

## 2023-07-13 VITALS
HEIGHT: 71 IN | RESPIRATION RATE: 18 BRPM | HEART RATE: 85 BPM | SYSTOLIC BLOOD PRESSURE: 189 MMHG | TEMPERATURE: 99 F | WEIGHT: 261.91 LBS | DIASTOLIC BLOOD PRESSURE: 103 MMHG | OXYGEN SATURATION: 96 %

## 2023-07-13 LAB
ALBUMIN SERPL ELPH-MCNC: 4.4 G/DL — SIGNIFICANT CHANGE UP (ref 3.3–5.2)
ALP SERPL-CCNC: 79 U/L — SIGNIFICANT CHANGE UP (ref 40–120)
ALT FLD-CCNC: 43 U/L — HIGH
ANION GAP SERPL CALC-SCNC: 12 MMOL/L — SIGNIFICANT CHANGE UP (ref 5–17)
APPEARANCE UR: CLEAR — SIGNIFICANT CHANGE UP
APPEARANCE UR: CLEAR — SIGNIFICANT CHANGE UP
AST SERPL-CCNC: 35 U/L — SIGNIFICANT CHANGE UP
BASOPHILS # BLD AUTO: 0.03 K/UL — SIGNIFICANT CHANGE UP (ref 0–0.2)
BASOPHILS NFR BLD AUTO: 0.4 % — SIGNIFICANT CHANGE UP (ref 0–2)
BILIRUB SERPL-MCNC: 0.8 MG/DL — SIGNIFICANT CHANGE UP (ref 0.4–2)
BILIRUB UR-MCNC: NEGATIVE — SIGNIFICANT CHANGE UP
BILIRUB UR-MCNC: NEGATIVE — SIGNIFICANT CHANGE UP
BUN SERPL-MCNC: 11.5 MG/DL — SIGNIFICANT CHANGE UP (ref 8–20)
CALCIUM SERPL-MCNC: 9.4 MG/DL — SIGNIFICANT CHANGE UP (ref 8.4–10.5)
CHLORIDE SERPL-SCNC: 104 MMOL/L — SIGNIFICANT CHANGE UP (ref 96–108)
CO2 SERPL-SCNC: 22 MMOL/L — SIGNIFICANT CHANGE UP (ref 22–29)
COD CRY URNS QL: ABNORMAL
COLOR SPEC: YELLOW — SIGNIFICANT CHANGE UP
COLOR SPEC: YELLOW — SIGNIFICANT CHANGE UP
CREAT SERPL-MCNC: 0.72 MG/DL — SIGNIFICANT CHANGE UP (ref 0.5–1.3)
DIFF PNL FLD: NEGATIVE — SIGNIFICANT CHANGE UP
DIFF PNL FLD: NEGATIVE — SIGNIFICANT CHANGE UP
EGFR: 121 ML/MIN/1.73M2 — SIGNIFICANT CHANGE UP
EOSINOPHIL # BLD AUTO: 0.28 K/UL — SIGNIFICANT CHANGE UP (ref 0–0.5)
EOSINOPHIL NFR BLD AUTO: 3.8 % — SIGNIFICANT CHANGE UP (ref 0–6)
GLUCOSE SERPL-MCNC: 110 MG/DL — HIGH (ref 70–99)
GLUCOSE UR QL: NEGATIVE MG/DL — SIGNIFICANT CHANGE UP
GLUCOSE UR QL: NEGATIVE — SIGNIFICANT CHANGE UP
HCT VFR BLD CALC: 43.4 % — SIGNIFICANT CHANGE UP (ref 39–50)
HGB BLD-MCNC: 15.5 G/DL — SIGNIFICANT CHANGE UP (ref 13–17)
IMM GRANULOCYTES NFR BLD AUTO: 0.3 % — SIGNIFICANT CHANGE UP (ref 0–0.9)
KETONES UR-MCNC: ABNORMAL
KETONES UR-MCNC: NEGATIVE — SIGNIFICANT CHANGE UP
LEUKOCYTE ESTERASE UR-ACNC: ABNORMAL
LEUKOCYTE ESTERASE UR-ACNC: NEGATIVE — SIGNIFICANT CHANGE UP
LIDOCAIN IGE QN: 17 U/L — LOW (ref 22–51)
LYMPHOCYTES # BLD AUTO: 1.11 K/UL — SIGNIFICANT CHANGE UP (ref 1–3.3)
LYMPHOCYTES # BLD AUTO: 15.2 % — SIGNIFICANT CHANGE UP (ref 13–44)
MCHC RBC-ENTMCNC: 32 PG — SIGNIFICANT CHANGE UP (ref 27–34)
MCHC RBC-ENTMCNC: 35.7 GM/DL — SIGNIFICANT CHANGE UP (ref 32–36)
MCV RBC AUTO: 89.7 FL — SIGNIFICANT CHANGE UP (ref 80–100)
MONOCYTES # BLD AUTO: 0.41 K/UL — SIGNIFICANT CHANGE UP (ref 0–0.9)
MONOCYTES NFR BLD AUTO: 5.6 % — SIGNIFICANT CHANGE UP (ref 2–14)
NEUTROPHILS # BLD AUTO: 5.44 K/UL — SIGNIFICANT CHANGE UP (ref 1.8–7.4)
NEUTROPHILS NFR BLD AUTO: 74.7 % — SIGNIFICANT CHANGE UP (ref 43–77)
NITRITE UR-MCNC: NEGATIVE — SIGNIFICANT CHANGE UP
NITRITE UR-MCNC: POSITIVE
PH UR: 5 — SIGNIFICANT CHANGE UP (ref 5–8)
PH UR: 6 — SIGNIFICANT CHANGE UP (ref 5–8)
PLATELET # BLD AUTO: 274 K/UL — SIGNIFICANT CHANGE UP (ref 150–400)
POTASSIUM SERPL-MCNC: 4.2 MMOL/L — SIGNIFICANT CHANGE UP (ref 3.5–5.3)
POTASSIUM SERPL-SCNC: 4.2 MMOL/L — SIGNIFICANT CHANGE UP (ref 3.5–5.3)
PROT SERPL-MCNC: 7.3 G/DL — SIGNIFICANT CHANGE UP (ref 6.6–8.7)
PROT UR-MCNC: 15
PROT UR-MCNC: SIGNIFICANT CHANGE UP MG/DL
RBC # BLD: 4.84 M/UL — SIGNIFICANT CHANGE UP (ref 4.2–5.8)
RBC # FLD: 11.8 % — SIGNIFICANT CHANGE UP (ref 10.3–14.5)
RBC CASTS # UR COMP ASSIST: SIGNIFICANT CHANGE UP /HPF (ref 0–4)
SODIUM SERPL-SCNC: 138 MMOL/L — SIGNIFICANT CHANGE UP (ref 135–145)
SP GR SPEC: 1.01 — SIGNIFICANT CHANGE UP (ref 1.01–1.02)
SP GR SPEC: 1.03 — HIGH (ref 1.01–1.02)
UROBILINOGEN FLD QL: 1
UROBILINOGEN FLD QL: NEGATIVE MG/DL — SIGNIFICANT CHANGE UP
WBC # BLD: 7.29 K/UL — SIGNIFICANT CHANGE UP (ref 3.8–10.5)
WBC # FLD AUTO: 7.29 K/UL — SIGNIFICANT CHANGE UP (ref 3.8–10.5)
WBC UR QL: SIGNIFICANT CHANGE UP /HPF (ref 0–5)

## 2023-07-13 PROCEDURE — 74177 CT ABD & PELVIS W/CONTRAST: CPT | Mod: 26,MA

## 2023-07-13 PROCEDURE — 99285 EMERGENCY DEPT VISIT HI MDM: CPT

## 2023-07-13 PROCEDURE — 93010 ELECTROCARDIOGRAM REPORT: CPT

## 2023-07-13 RX ORDER — ONDANSETRON 8 MG/1
4 TABLET, FILM COATED ORAL EVERY 6 HOURS
Refills: 0 | Status: DISCONTINUED | OUTPATIENT
Start: 2023-07-13 | End: 2023-07-14

## 2023-07-13 RX ORDER — AMLODIPINE BESYLATE 2.5 MG/1
1 TABLET ORAL
Refills: 0 | DISCHARGE

## 2023-07-13 RX ORDER — HYDRALAZINE HCL 50 MG
10 TABLET ORAL ONCE
Refills: 0 | Status: COMPLETED | OUTPATIENT
Start: 2023-07-13 | End: 2023-07-13

## 2023-07-13 RX ORDER — KETOROLAC TROMETHAMINE 30 MG/ML
15 SYRINGE (ML) INJECTION EVERY 6 HOURS
Refills: 0 | Status: DISCONTINUED | OUTPATIENT
Start: 2023-07-13 | End: 2023-07-14

## 2023-07-13 RX ORDER — ENOXAPARIN SODIUM 100 MG/ML
40 INJECTION SUBCUTANEOUS EVERY 24 HOURS
Refills: 0 | Status: DISCONTINUED | OUTPATIENT
Start: 2023-07-13 | End: 2023-07-14

## 2023-07-13 RX ORDER — ACETAMINOPHEN 500 MG
975 TABLET ORAL EVERY 6 HOURS
Refills: 0 | Status: DISCONTINUED | OUTPATIENT
Start: 2023-07-13 | End: 2023-07-14

## 2023-07-13 RX ORDER — CEFTRIAXONE 500 MG/1
1000 INJECTION, POWDER, FOR SOLUTION INTRAMUSCULAR; INTRAVENOUS EVERY 24 HOURS
Refills: 0 | Status: DISCONTINUED | OUTPATIENT
Start: 2023-07-13 | End: 2023-07-14

## 2023-07-13 RX ORDER — CEFTRIAXONE 500 MG/1
2000 INJECTION, POWDER, FOR SOLUTION INTRAMUSCULAR; INTRAVENOUS ONCE
Refills: 0 | Status: DISCONTINUED | OUTPATIENT
Start: 2023-07-13 | End: 2023-07-13

## 2023-07-13 RX ORDER — PIPERACILLIN AND TAZOBACTAM 4; .5 G/20ML; G/20ML
3.38 INJECTION, POWDER, LYOPHILIZED, FOR SOLUTION INTRAVENOUS ONCE
Refills: 0 | Status: COMPLETED | OUTPATIENT
Start: 2023-07-13 | End: 2023-07-13

## 2023-07-13 RX ORDER — SODIUM CHLORIDE 9 MG/ML
1000 INJECTION INTRAMUSCULAR; INTRAVENOUS; SUBCUTANEOUS ONCE
Refills: 0 | Status: COMPLETED | OUTPATIENT
Start: 2023-07-13 | End: 2023-07-13

## 2023-07-13 RX ORDER — CEFTRIAXONE 500 MG/1
1000 INJECTION, POWDER, FOR SOLUTION INTRAMUSCULAR; INTRAVENOUS EVERY 24 HOURS
Refills: 0 | Status: DISCONTINUED | OUTPATIENT
Start: 2023-07-13 | End: 2023-07-13

## 2023-07-13 RX ORDER — AMLODIPINE BESYLATE 2.5 MG/1
10 TABLET ORAL DAILY
Refills: 0 | Status: DISCONTINUED | OUTPATIENT
Start: 2023-07-13 | End: 2023-07-14

## 2023-07-13 RX ORDER — KETOROLAC TROMETHAMINE 30 MG/ML
30 SYRINGE (ML) INJECTION ONCE
Refills: 0 | Status: DISCONTINUED | OUTPATIENT
Start: 2023-07-13 | End: 2023-07-13

## 2023-07-13 RX ORDER — SECUKINUMAB 150 MG/ML
150 INJECTION SUBCUTANEOUS
Refills: 0 | DISCHARGE

## 2023-07-13 RX ORDER — SODIUM CHLORIDE 9 MG/ML
1000 INJECTION, SOLUTION INTRAVENOUS
Refills: 0 | Status: DISCONTINUED | OUTPATIENT
Start: 2023-07-13 | End: 2023-07-14

## 2023-07-13 RX ORDER — CEFTRIAXONE 500 MG/1
2000 INJECTION, POWDER, FOR SOLUTION INTRAMUSCULAR; INTRAVENOUS ONCE
Refills: 0 | Status: COMPLETED | OUTPATIENT
Start: 2023-07-13 | End: 2023-07-13

## 2023-07-13 RX ADMIN — AMLODIPINE BESYLATE 10 MILLIGRAM(S): 2.5 TABLET ORAL at 13:19

## 2023-07-13 RX ADMIN — Medication 30 MILLIGRAM(S): at 12:14

## 2023-07-13 RX ADMIN — Medication 30 MILLIGRAM(S): at 09:13

## 2023-07-13 RX ADMIN — SODIUM CHLORIDE 84 MILLILITER(S): 9 INJECTION, SOLUTION INTRAVENOUS at 21:50

## 2023-07-13 RX ADMIN — Medication 10 MILLIGRAM(S): at 21:50

## 2023-07-13 RX ADMIN — CEFTRIAXONE 2000 MILLIGRAM(S): 500 INJECTION, POWDER, FOR SOLUTION INTRAMUSCULAR; INTRAVENOUS at 14:07

## 2023-07-13 RX ADMIN — PIPERACILLIN AND TAZOBACTAM 3.38 GRAM(S): 4; .5 INJECTION, POWDER, LYOPHILIZED, FOR SOLUTION INTRAVENOUS at 13:56

## 2023-07-13 RX ADMIN — SODIUM CHLORIDE 1000 MILLILITER(S): 9 INJECTION INTRAMUSCULAR; INTRAVENOUS; SUBCUTANEOUS at 09:13

## 2023-07-13 RX ADMIN — SODIUM CHLORIDE 1000 MILLILITER(S): 9 INJECTION INTRAMUSCULAR; INTRAVENOUS; SUBCUTANEOUS at 12:14

## 2023-07-13 RX ADMIN — PIPERACILLIN AND TAZOBACTAM 200 GRAM(S): 4; .5 INJECTION, POWDER, LYOPHILIZED, FOR SOLUTION INTRAVENOUS at 13:05

## 2023-07-13 RX ADMIN — SODIUM CHLORIDE 84 MILLILITER(S): 9 INJECTION, SOLUTION INTRAVENOUS at 14:07

## 2023-07-13 NOTE — ED ADULT NURSE NOTE - NSFALLUNIVINTERV_ED_ALL_ED
Bed/Stretcher in lowest position, wheels locked, appropriate side rails in place/Call bell, personal items and telephone in reach/Instruct patient to call for assistance before getting out of bed/chair/stretcher/Non-slip footwear applied when patient is off stretcher/[place] to call system/Physically safe environment - no spills, clutter or unnecessary equipment/Purposeful proactive rounding/Room/bathroom lighting operational, light cord in reach

## 2023-07-13 NOTE — ED STATDOCS - CARE PLAN
1 Principal Discharge DX:	Colitis   Principal Discharge DX:	Colitis  Secondary Diagnosis:	Infection as cause of abscess of colon

## 2023-07-13 NOTE — ED STATDOCS - OBJECTIVE STATEMENT
37 y/o male with PMHx of psoriasis, HTN presents to the ED c/o LLQ abdominal pain since yesterday morning associated with N/V/D, Pt states develops cramping pain shortly after drinking water. Pt notes over past 5 days was away, not eating well, drinking alcohol.

## 2023-07-13 NOTE — ED ADULT TRIAGE NOTE - CHIEF COMPLAINT QUOTE
pt c/o lower left abd pain, started yesterday c/o severe vomiting & diarrhea most of the day  A&Ox3, resp wnl, pt did not take BP pills x 5 days, " party week"

## 2023-07-13 NOTE — H&P ADULT - ASSESSMENT
38M with PMH of HTN, psoriasis presents with 1 day of new onset abdominal pain associated with diarrhea and nausea/vomiting with PO intake. Denies similar pain in the past; reports being concerned about sushi he ate while celebrating his birthday. No leukocytosis on presentation, afebrile. CT with mural thickening of ascending and transverse colon with pericolonic edema at hepatic flexure with concern for possible microperforation vs intraluminal air; there is also free pelvic fluid noted on scan. On review of CT, air appears to be intraluminal which is consistent with lack of tenderness in RUQ on exam. There is suprapubic tenderness to deep palpation; UA does appear positive for UTI. Given history, exam, lack of leukocytosis, and imaging findings, lower concern for acute microperforation; infectious process does remain on differential. Patient would benefit from admission for follow-up abdominal exams, PO challenge, and stool studies.    Plan:  -will admit to colorectal surgery - Dr. Dominguez  -SAEs  -prn antiemetics  -will start with CLD, ADAT  -stool studies  -home meds as able  -dvt ppx

## 2023-07-13 NOTE — H&P ADULT - NSHPPOACENTRALVENOUSCATHETER_GEN_ALL_CORE
Prior Authorization Retail Medication Request    Medication/Dose: D-3-5 125 MCG(5000 UT) Capsules      Insurance Name: [facility]  Insurance ID: [id number]       Pharmacy Information (if different than what is on RX)      Name:  [facility] [address] [place] [address], [id number]  Phone:  [phone]   no

## 2023-07-13 NOTE — ED STATDOCS - CLINICAL SUMMARY MEDICAL DECISION MAKING FREE TEXT BOX
Pt p/w abdominal pain, epigastric and LLQ tenderness, will check labs, CT, symptom control, differentials include gastritis, pancreatitis, diverticulitis Pt p/w abdominal pain, epigastric and LLQ tenderness, will check labs, CT, symptom control, differentials include gastritis, pancreatitis, diverticulitis    Mimi Maldonado PA : PT evaluated by intake physician. HPI/PE/ROS as noted above. Will follow up plan per intake physician   Labs unremarkable. CT +colitis, concern for microperforation. Surgery consulted -advising admission.

## 2023-07-13 NOTE — ED STATDOCS - ATTENDING APP SHARED VISIT CONTRIBUTION OF CARE
This was a shared visit with MADALYN. I reviewed and verified the documentation and independently performed the documented history/exam/mdm.

## 2023-07-13 NOTE — H&P ADULT - NSHPREVIEWOFSYSTEMS_GEN_ALL_CORE
REVIEW OF SYSTEMS:    CONSTITUTIONAL:  No weakness, fevers or chills  EYES/ENT:  No visual changes;  No vertigo or throat pain   NECK:  No pain or stiffness  RESPIRATORY:  No cough, wheezing, hemoptysis; No shortness of breath  CARDIOVASCULAR:  No chest pain or palpitations  GASTROINTESTINAL: mild, poorly localized abdominal discomfort; reports diarrhea, mild nausea and vomiting with PO intake; denies bloody BMs  GENITOURINARY:  No dysuria, frequency or hematuria  MUSCULOSKELETAL:  FROM all extremities, normal strength, No calf tenderness  NEUROLOGICAL:  No numbness or weakness  SKIN:  No itching, rashes

## 2023-07-13 NOTE — H&P ADULT - NSHPPHYSICALEXAM_GEN_ALL_CORE
Constitutional: NAD  HEENT: PERRLA, EOMI, no drainage or redness  Respiratory: respirations even, unlabored on room air  Cardiovascular: RRR  Gastrointestinal: Soft, non-distended, mild suprapubic TTP, no rebound or guarding, non-peritoneal  Extremities: No peripheral edema, No cyanosis, clubbing   Vascular: Equal and normal pulses: 2+ peripheral pulses throughout  Neurological: A&O x 3; no gross deficit  Psychiatric: Normal mood, normal affect  Musculoskeletal: No joint pain, swelling or deformity; no limitation of movement  Skin: mild psoriasis

## 2023-07-13 NOTE — ED STATDOCS - NS ED ATTENDING STATEMENT MOD
This was a shared visit with the MADALYN. I reviewed and verified the documentation and independently performed the documented:

## 2023-07-13 NOTE — H&P ADULT - NSHPLABSRESULTS_GEN_ALL_CORE
15.5   7.29  )-----------( 274      ( 2023 09:10 )             43.4     07-13    138  |  104  |  11.5  ----------------------------<  110<H>  4.2   |  22.0  |  0.72    Ca    9.4      2023 09:10    TPro  7.3  /  Alb  4.4  /  TBili  0.8  /  DBili  x   /  AST  35  /  ALT  43<H>  /  AlkPhos  79  07-13    Urinalysis Basic - ( 2023 10:50 )    Color: Yellow / Appearance: Clear / S.030 / pH: x  Gluc: x / Ketone: Trace  / Bili: Negative / Urobili: 1   Blood: x / Protein: 15 / Nitrite: Positive   Leuk Esterase: Trace / RBC: 0-2 /HPF / WBC 0-2 /HPF   Sq Epi: x / Non Sq Epi: x / Bacteria: x

## 2023-07-13 NOTE — H&P ADULT - HISTORY OF PRESENT ILLNESS
38M with PMH including HTN and psoriasis presents to ED with 1 day of abdominal pain with associated diarrhea and nausea/vomiting with attempted PO intake. Patient reports celebrating birthday over past several days, ending Tuesday, with abdominal pain beginning the next morning. Pain began in the LLQ and was accompanied by watery, yellow-colored diarrhea. He reports that pain has become less localized over past 24 hours, and this morning he vomited while trying to drink water. He denies prior similar symptoms, no personal or family history of IBD or colon cancer, has never had a colonoscopy. Does take a biologic agent for his psoriasis.     Admission labs are without leukocytosis; non-tachycardic, afebrile, hypertensive (patient reports not taking home antihypertensives over past several days). CT demonstrates mural thickening of ascending and transverse colon; there is a question of localized microperforation near the hepatic flexure.

## 2023-07-14 ENCOUNTER — TRANSCRIPTION ENCOUNTER (OUTPATIENT)
Age: 38
End: 2023-07-14

## 2023-07-14 VITALS
RESPIRATION RATE: 18 BRPM | TEMPERATURE: 98 F | OXYGEN SATURATION: 98 % | SYSTOLIC BLOOD PRESSURE: 157 MMHG | DIASTOLIC BLOOD PRESSURE: 94 MMHG | HEART RATE: 86 BPM

## 2023-07-14 DIAGNOSIS — K52.9 NONINFECTIVE GASTROENTERITIS AND COLITIS, UNSPECIFIED: ICD-10-CM

## 2023-07-14 LAB
ANION GAP SERPL CALC-SCNC: 10 MMOL/L — SIGNIFICANT CHANGE UP (ref 5–17)
BASOPHILS # BLD AUTO: 0.03 K/UL — SIGNIFICANT CHANGE UP (ref 0–0.2)
BASOPHILS NFR BLD AUTO: 0.6 % — SIGNIFICANT CHANGE UP (ref 0–2)
BUN SERPL-MCNC: 7.1 MG/DL — LOW (ref 8–20)
CALCIUM SERPL-MCNC: 9 MG/DL — SIGNIFICANT CHANGE UP (ref 8.4–10.5)
CHLORIDE SERPL-SCNC: 103 MMOL/L — SIGNIFICANT CHANGE UP (ref 96–108)
CO2 SERPL-SCNC: 25 MMOL/L — SIGNIFICANT CHANGE UP (ref 22–29)
CREAT SERPL-MCNC: 0.64 MG/DL — SIGNIFICANT CHANGE UP (ref 0.5–1.3)
CULTURE RESULTS: SIGNIFICANT CHANGE UP
EGFR: 125 ML/MIN/1.73M2 — SIGNIFICANT CHANGE UP
EOSINOPHIL # BLD AUTO: 0.35 K/UL — SIGNIFICANT CHANGE UP (ref 0–0.5)
EOSINOPHIL NFR BLD AUTO: 6.6 % — HIGH (ref 0–6)
GI PCR PANEL: SIGNIFICANT CHANGE UP
GLUCOSE SERPL-MCNC: 90 MG/DL — SIGNIFICANT CHANGE UP (ref 70–99)
HCT VFR BLD CALC: 39.4 % — SIGNIFICANT CHANGE UP (ref 39–50)
HGB BLD-MCNC: 14 G/DL — SIGNIFICANT CHANGE UP (ref 13–17)
IMM GRANULOCYTES NFR BLD AUTO: 0.4 % — SIGNIFICANT CHANGE UP (ref 0–0.9)
LYMPHOCYTES # BLD AUTO: 1.14 K/UL — SIGNIFICANT CHANGE UP (ref 1–3.3)
LYMPHOCYTES # BLD AUTO: 21.5 % — SIGNIFICANT CHANGE UP (ref 13–44)
MAGNESIUM SERPL-MCNC: 2.2 MG/DL — SIGNIFICANT CHANGE UP (ref 1.6–2.6)
MCHC RBC-ENTMCNC: 32.3 PG — SIGNIFICANT CHANGE UP (ref 27–34)
MCHC RBC-ENTMCNC: 35.5 GM/DL — SIGNIFICANT CHANGE UP (ref 32–36)
MCV RBC AUTO: 90.8 FL — SIGNIFICANT CHANGE UP (ref 80–100)
MONOCYTES # BLD AUTO: 0.37 K/UL — SIGNIFICANT CHANGE UP (ref 0–0.9)
MONOCYTES NFR BLD AUTO: 7 % — SIGNIFICANT CHANGE UP (ref 2–14)
NEUTROPHILS # BLD AUTO: 3.39 K/UL — SIGNIFICANT CHANGE UP (ref 1.8–7.4)
NEUTROPHILS NFR BLD AUTO: 63.9 % — SIGNIFICANT CHANGE UP (ref 43–77)
PHOSPHATE SERPL-MCNC: 3.6 MG/DL — SIGNIFICANT CHANGE UP (ref 2.4–4.7)
PLATELET # BLD AUTO: 236 K/UL — SIGNIFICANT CHANGE UP (ref 150–400)
POTASSIUM SERPL-MCNC: 4 MMOL/L — SIGNIFICANT CHANGE UP (ref 3.5–5.3)
POTASSIUM SERPL-SCNC: 4 MMOL/L — SIGNIFICANT CHANGE UP (ref 3.5–5.3)
RBC # BLD: 4.34 M/UL — SIGNIFICANT CHANGE UP (ref 4.2–5.8)
RBC # FLD: 11.8 % — SIGNIFICANT CHANGE UP (ref 10.3–14.5)
SODIUM SERPL-SCNC: 138 MMOL/L — SIGNIFICANT CHANGE UP (ref 135–145)
SPECIMEN SOURCE: SIGNIFICANT CHANGE UP
WBC # BLD: 5.3 K/UL — SIGNIFICANT CHANGE UP (ref 3.8–10.5)
WBC # FLD AUTO: 5.3 K/UL — SIGNIFICANT CHANGE UP (ref 3.8–10.5)

## 2023-07-14 PROCEDURE — 93005 ELECTROCARDIOGRAM TRACING: CPT

## 2023-07-14 PROCEDURE — 99285 EMERGENCY DEPT VISIT HI MDM: CPT | Mod: 25

## 2023-07-14 PROCEDURE — 36415 COLL VENOUS BLD VENIPUNCTURE: CPT

## 2023-07-14 PROCEDURE — 87086 URINE CULTURE/COLONY COUNT: CPT

## 2023-07-14 PROCEDURE — 96365 THER/PROPH/DIAG IV INF INIT: CPT

## 2023-07-14 PROCEDURE — 84100 ASSAY OF PHOSPHORUS: CPT

## 2023-07-14 PROCEDURE — 74177 CT ABD & PELVIS W/CONTRAST: CPT | Mod: MA

## 2023-07-14 PROCEDURE — 87507 IADNA-DNA/RNA PROBE TQ 12-25: CPT

## 2023-07-14 PROCEDURE — 96375 TX/PRO/DX INJ NEW DRUG ADDON: CPT

## 2023-07-14 PROCEDURE — 99232 SBSQ HOSP IP/OBS MODERATE 35: CPT

## 2023-07-14 PROCEDURE — 80048 BASIC METABOLIC PNL TOTAL CA: CPT

## 2023-07-14 PROCEDURE — 96361 HYDRATE IV INFUSION ADD-ON: CPT

## 2023-07-14 PROCEDURE — 83735 ASSAY OF MAGNESIUM: CPT

## 2023-07-14 PROCEDURE — 81001 URINALYSIS AUTO W/SCOPE: CPT

## 2023-07-14 PROCEDURE — 80053 COMPREHEN METABOLIC PANEL: CPT

## 2023-07-14 PROCEDURE — 85025 COMPLETE CBC W/AUTO DIFF WBC: CPT

## 2023-07-14 PROCEDURE — 83690 ASSAY OF LIPASE: CPT

## 2023-07-14 RX ADMIN — ENOXAPARIN SODIUM 40 MILLIGRAM(S): 100 INJECTION SUBCUTANEOUS at 05:42

## 2023-07-14 RX ADMIN — SODIUM CHLORIDE 84 MILLILITER(S): 9 INJECTION, SOLUTION INTRAVENOUS at 05:43

## 2023-07-14 RX ADMIN — CEFTRIAXONE 1000 MILLIGRAM(S): 500 INJECTION, POWDER, FOR SOLUTION INTRAMUSCULAR; INTRAVENOUS at 05:42

## 2023-07-14 RX ADMIN — AMLODIPINE BESYLATE 10 MILLIGRAM(S): 2.5 TABLET ORAL at 05:42

## 2023-07-14 NOTE — DISCHARGE NOTE NURSING/CASE MANAGEMENT/SOCIAL WORK - PATIENT PORTAL LINK FT
You can access the FollowMyHealth Patient Portal offered by [facility] by registering at the following website: [email or web address]. By joining [facility]’s FollowMyHealth portal, you will also be able to view your health information using other applications (apps) compatible with our system.

## 2023-07-14 NOTE — DISCHARGE NOTE PROVIDER - NSDCMRMEDTOKEN_GEN_ALL_CORE_FT
amLODIPine 10 mg oral tablet: 1 orally once a day hold for SBP &lt; 110  Cosentyx 150 mg/mL subcutaneous solution: 150 subcutaneously  Isentress 400 mg oral tablet: 1 tab(s) orally 2 times a day  Truvada 200 mg-300 mg oral tablet: 1 tab(s) orally once a day

## 2023-07-14 NOTE — DISCHARGE NOTE NURSING/CASE MANAGEMENT/SOCIAL WORK - NSDCPEFALRISK_GEN_ALL_CORE
For information on Fall & Injury Prevention, visit: [email or web address] OR  [email or web address] OR  https://www.cdc.gov/steadi/patient.html

## 2023-07-14 NOTE — PROGRESS NOTE ADULT - TIME BILLING
Patient denies any abdominal pain whatsoever.  Tolerated clear liquid diet.  Remains afebrile, WBC normal.  On exam abdomen is soft, nondistended, completely nontender.  Denies further BMs - stool studies still pending.  On antibiotics for incidentally identified UTI.    -- Continue antibiotics for UTI.  -- GI PCR panel  -- Serial abdominal exams  -- Advance diet as tolerated  -- Possible discharge later today pending diet tolerance, GI PCR results.

## 2023-07-14 NOTE — DISCHARGE NOTE PROVIDER - CARE PROVIDER_API CALL
Fatimah Dominguez  Colon/Rectal Surgery  [address], [place] [address]  Phone: [phone]  Fax: [phone]  Follow Up Time: 2 weeks

## 2023-07-14 NOTE — DISCHARGE NOTE PROVIDER - NSDCCAREPROVSEEN_GEN_ALL_CORE_FT
Fatimah Dominguez
no abdominal pain, no bloating, no constipation, no diarrhea, no nausea and no vomiting.

## 2023-07-14 NOTE — DISCHARGE NOTE PROVIDER - NSDCFUSCHEDAPPT_GEN_ALL_CORE_FT
Elmer Sue  [facility]  [employer] [address] [facility]  Scheduled Appointment: 09/08/2023

## 2023-07-14 NOTE — PROGRESS NOTE ADULT - SUBJECTIVE AND OBJECTIVE BOX
Subjective: Patient is seen and examined at bedside. No acute complaints, he denies abdominal pain, nausea, vomiting, or diarrhea.     MEDICATIONS  (STANDING):  amLODIPine   Tablet 10 milliGRAM(s) Oral daily  cefTRIAXone Injectable. 1000 milliGRAM(s) IV Push every 24 hours  dextrose 5% + sodium chloride 0.45%. 1000 milliLiter(s) (84 mL/Hr) IV Continuous <Continuous>  enoxaparin Injectable 40 milliGRAM(s) SubCutaneous every 24 hours    MEDICATIONS  (PRN):  acetaminophen     Tablet .. 975 milliGRAM(s) Oral every 6 hours PRN Mild Pain (1 - 3)  ketorolac   Injectable 15 milliGRAM(s) IV Push every 6 hours PRN Severe Pain (7 - 10)  ondansetron Injectable 4 milliGRAM(s) IV Push every 6 hours PRN Nausea    Vital Signs Last 24 Hrs  T(C): 36.6 (14 Jul 2023 04:52), Max: 37.2 (13 Jul 2023 07:48)  T(F): 97.9 (14 Jul 2023 04:52), Max: 98.9 (13 Jul 2023 07:48)  HR: 64 (14 Jul 2023 04:52) (64 - 85)  BP: 130/74 (14 Jul 2023 04:52) (130/74 - 189/103)  BP(mean): --  RR: 18 (14 Jul 2023 04:52) (17 - 18)  SpO2: 99% (14 Jul 2023 04:52) (96% - 99%)  Parameters below as of 14 Jul 2023 04:52  Patient On (Oxygen Delivery Method): room air    Physical Exam:  Constitutional: NAD  HEENT: PERRL, EOMI  Neck: No JVD, FROM without pain  Respiratory: Respirations non-labored, no accessory muscle use  Gastrointestinal: Soft, non-tender to palpation, non-distended  Extremities: No peripheral edema, No cyanosis  Neurological: A&O x 3; without gross deficit    LABS:                     14.0   5.30  )-----------( 236      ( 14 Jul 2023 04:26 )             39.4   07-14  138  |  103  |  7.1<L>  ----------------------------<  90  4.0   |  25.0  |  0.64  Ca    9.0      14 Jul 2023 04:26  Phos  3.6     07-14  Mg     2.2     07-14  TPro  7.3  /  Alb  4.4  /  TBili  0.8  /  DBili  x   /  AST  35  /  ALT  43<H>  /  AlkPhos  79  07-13  Urinalysis Basic - ( 14 Jul 2023 04:26 )  Color: x / Appearance: x / SG: x / pH: x  Gluc: 90 mg/dL / Ketone: x  / Bili: x / Urobili: x   Blood: x / Protein: x / Nitrite: x   Leuk Esterase: x / RBC: x / WBC x   Sq Epi: x / Non Sq Epi: x / Bacteria: x    A: Patient is a 39 yo m who presented with abdominal pain, CT showed mural thickening of ascending and transverse colon with pericolonic edema at hepatic flexure with concern for possible microperforation vs intraluminal air and free pelvic fluid. Low concern for acute microperforation. He is tolerating a CLD and abdominal pain has improved.     Plan:   Advance diet as tolerated   encourage oob ambulation and IS use   pain control   dvt ppx   serial abdominal exams   f/u stool studies

## 2023-07-14 NOTE — DISCHARGE NOTE PROVIDER - HOSPITAL COURSE
38 year old M, no PMH presenting with abdominal pain, nausea and vomiting, worked upo and found to have a ascending and transverse colitis, microperforation at the hepatic flexure, admitted for serial abdominal exams and treated for a UTI.  His stool studies were sent.  He was stable enough to be discharged, we will follow up GI PCR and contact patient if it comes back with anything.

## 2023-07-14 NOTE — DISCHARGE NOTE PROVIDER - NSDCFUADDINST_GEN_ALL_CORE_FT
If you have worsening abdominal pain, nausea or vomiting, fever or chills, please call the office.

## 2023-07-15 ENCOUNTER — TRANSCRIPTION ENCOUNTER (OUTPATIENT)
Age: 38
End: 2023-07-15

## 2023-07-17 PROBLEM — I10 ESSENTIAL (PRIMARY) HYPERTENSION: Chronic | Status: ACTIVE | Noted: 2023-07-13

## 2023-07-17 PROBLEM — L40.9 PSORIASIS, UNSPECIFIED: Chronic | Status: ACTIVE | Noted: 2023-07-13

## 2023-07-27 ENCOUNTER — APPOINTMENT (OUTPATIENT)
Dept: FAMILY MEDICINE | Facility: CLINIC | Age: 38
End: 2023-07-27

## 2023-08-07 ENCOUNTER — RX RENEWAL (OUTPATIENT)
Age: 38
End: 2023-08-07

## 2023-08-09 ENCOUNTER — APPOINTMENT (OUTPATIENT)
Dept: COLORECTAL SURGERY | Facility: CLINIC | Age: 38
End: 2023-08-09

## 2023-09-08 ENCOUNTER — APPOINTMENT (OUTPATIENT)
Dept: BARIATRICS/WEIGHT MGMT | Facility: CLINIC | Age: 38
End: 2023-09-08

## 2023-10-30 ENCOUNTER — APPOINTMENT (OUTPATIENT)
Dept: FAMILY MEDICINE | Facility: CLINIC | Age: 38
End: 2023-10-30

## 2023-10-30 ENCOUNTER — RX RENEWAL (OUTPATIENT)
Age: 38
End: 2023-10-30

## 2023-10-30 RX ORDER — HYDROCHLOROTHIAZIDE 12.5 MG/1
12.5 TABLET ORAL DAILY
Qty: 90 | Refills: 1 | Status: ACTIVE | COMMUNITY
Start: 2023-04-25 | End: 1900-01-01

## 2024-03-28 ENCOUNTER — APPOINTMENT (OUTPATIENT)
Dept: FAMILY MEDICINE | Facility: CLINIC | Age: 39
End: 2024-03-28

## 2024-04-05 ENCOUNTER — RX RENEWAL (OUTPATIENT)
Age: 39
End: 2024-04-05

## 2024-04-05 RX ORDER — AMLODIPINE BESYLATE 10 MG/1
10 TABLET ORAL
Qty: 90 | Refills: 0 | Status: ACTIVE | COMMUNITY
Start: 2022-04-19 | End: 1900-01-01

## 2024-06-05 ENCOUNTER — APPOINTMENT (OUTPATIENT)
Dept: ORTHOPEDIC SURGERY | Facility: CLINIC | Age: 39
End: 2024-06-05
Payer: COMMERCIAL

## 2024-06-05 VITALS
DIASTOLIC BLOOD PRESSURE: 100 MMHG | HEART RATE: 99 BPM | WEIGHT: 265 LBS | BODY MASS INDEX: 37.1 KG/M2 | SYSTOLIC BLOOD PRESSURE: 150 MMHG | HEIGHT: 71 IN

## 2024-06-05 DIAGNOSIS — M77.10 LATERAL EPICONDYLITIS, UNSPECIFIED ELBOW: ICD-10-CM

## 2024-06-05 DIAGNOSIS — M25.529 PAIN IN UNSPECIFIED ELBOW: ICD-10-CM

## 2024-06-05 PROCEDURE — 99204 OFFICE O/P NEW MOD 45 MIN: CPT

## 2024-06-05 PROCEDURE — 73080 X-RAY EXAM OF ELBOW: CPT | Mod: LT

## 2024-06-05 RX ORDER — DICLOFENAC POTASSIUM 50 MG/1
50 TABLET, COATED ORAL 3 TIMES DAILY
Qty: 60 | Refills: 2 | Status: ACTIVE | COMMUNITY
Start: 2024-06-05 | End: 1900-01-01

## 2024-06-05 NOTE — HISTORY OF PRESENT ILLNESS
[de-identified] : 38yM pw L elbow pain after picking up large oxygen tank.  Also with some pinky numbness when fishing or after sleeping. Pt has felt increasing elbow pain for the last 2-3 months.  Basic activities such as carrying groceries worsen the pain as do certain movements of the wrist.  Pt has tried activity modification and NSAIDs with minimal relief, pain level remains a 6/10.  Also notes increased stiffness in the elbow.  Has not trialed physical therapy or injections.  Pt denies numbness, paresthesias, f/c.  No hx of elbow trauma or elbow surgery. Pt notes the pain interferes with activities of daily life and that overall functionality has been decreased.  They wish to discuss possible treatment options and return to baseline activity and mobility before symptomatic onset.

## 2024-06-05 NOTE — PHYSICAL EXAM
[de-identified] : Elbow: Skin intact Tender at lateral epicondyle Pain w resisted WE/WF Nontender at medial epicondyle, anterior elbow Full strength of biceps/triceps, (-) hook test Stable to v/v stress Full wrist/hand ROM 0-135 elbow rom 80/80 pronation/supination 5/5 epl fdp io SILT amur 2+ rad bcr  (+) braxton [de-identified] : The following radiographs were ordered and read by me during this patient's visit. I reviewed each radiograph in detail with the patient and discussed the findings as highlighted below.   3 views of the L elbow were obtained today that show no fracture, or dislocation. There are no degenerative changes seen. There is no malalignment. No obvious osseous abnormality. Otherwise unremarkable.

## 2024-06-05 NOTE — DISCUSSION/SUMMARY
[de-identified] : 38yM pw L common extensor strain and some mild cubital tunnel.  The patient was extensively counseled on treatment options including but not limited to observation, rest/activity modification, bracing, anti-inflammatory medications, physical therapy, injections, and surgery.  The natural history of the disease was thoroughly explained.  We discussed that the majority of the time, this condition can be initially treated conservatively. The patient will proceed with: -PT -wrist splint, night elbow splint -diclofenac rx provided - pt instructed to take with meals and not with other nsaid's including advil, aleve, and toradol.  The pt understands to stop taking the medication if any stomach sx develop or they develop any type of bleeding or bruising, at which point they will present to their PMD -pt was instructed on the importance of resting, icing and elevating to minimize swelling -RTC 6w   I have personally obtained the history, reviewed the ROS as noted, and performed the physical examination today.  The patient and I discussed the assessment and options and developed the plan.  All questions were answered and the patient stated their understanding of the treatment plan and appreciation of the visit.   My cumulative time spent on this patient's visit included: Preparation for the visit, review of the medical records, review of pertinent diagnostic studies, examination and counseling of the patient on the above diagnosis, treatment plan and prognosis, orders of diagnostic tests, medications and/or appropriate procedures and documentation in the medical records of today's visit.   Héctor Fernández MD

## 2024-06-26 ENCOUNTER — APPOINTMENT (OUTPATIENT)
Dept: INTERNAL MEDICINE | Facility: CLINIC | Age: 39
End: 2024-06-26

## 2024-07-24 ENCOUNTER — APPOINTMENT (OUTPATIENT)
Dept: ORTHOPEDIC SURGERY | Facility: CLINIC | Age: 39
End: 2024-07-24

## 2024-12-12 NOTE — PATIENT PROFILE ADULT - NSPROIMPLANTSMEDDEV_GEN_A_NUR
Detail Level: Simple
Additional Notes: Grandfather and mother
Render Risk Assessment In Note?: no
None

## 2025-01-06 ENCOUNTER — APPOINTMENT (OUTPATIENT)
Dept: INTERNAL MEDICINE | Facility: CLINIC | Age: 40
End: 2025-01-06